# Patient Record
Sex: FEMALE | Race: WHITE | NOT HISPANIC OR LATINO | URBAN - METROPOLITAN AREA
[De-identification: names, ages, dates, MRNs, and addresses within clinical notes are randomized per-mention and may not be internally consistent; named-entity substitution may affect disease eponyms.]

---

## 2024-03-20 ENCOUNTER — HOSPITAL ENCOUNTER (OUTPATIENT)
Dept: RADIOLOGY | Facility: CLINIC | Age: 69
Discharge: HOME | End: 2024-03-20
Payer: MEDICARE

## 2024-03-20 ENCOUNTER — OFFICE VISIT (OUTPATIENT)
Dept: RHEUMATOLOGY | Facility: CLINIC | Age: 69
End: 2024-03-20
Payer: MEDICARE

## 2024-03-20 VITALS — SYSTOLIC BLOOD PRESSURE: 126 MMHG | HEART RATE: 70 BPM | DIASTOLIC BLOOD PRESSURE: 76 MMHG

## 2024-03-20 DIAGNOSIS — M15.9 GENERALIZED OSTEOARTHRITIS OF HAND: ICD-10-CM

## 2024-03-20 DIAGNOSIS — M15.9 PRIMARY OSTEOARTHRITIS INVOLVING MULTIPLE JOINTS: Primary | ICD-10-CM

## 2024-03-20 PROCEDURE — 99205 OFFICE O/P NEW HI 60 MIN: CPT | Performed by: STUDENT IN AN ORGANIZED HEALTH CARE EDUCATION/TRAINING PROGRAM

## 2024-03-20 PROCEDURE — 1036F TOBACCO NON-USER: CPT | Performed by: STUDENT IN AN ORGANIZED HEALTH CARE EDUCATION/TRAINING PROGRAM

## 2024-03-20 PROCEDURE — 73130 X-RAY EXAM OF HAND: CPT | Mod: 50

## 2024-03-20 PROCEDURE — 1159F MED LIST DOCD IN RCRD: CPT | Performed by: STUDENT IN AN ORGANIZED HEALTH CARE EDUCATION/TRAINING PROGRAM

## 2024-03-20 PROCEDURE — 73130 X-RAY EXAM OF HAND: CPT | Mod: BILATERAL PROCEDURE | Performed by: RADIOLOGY

## 2024-03-20 RX ORDER — ATORVASTATIN CALCIUM 10 MG/1
10 TABLET, FILM COATED ORAL DAILY
COMMUNITY

## 2024-03-20 RX ORDER — NAPROXEN 500 MG/1
TABLET ORAL EVERY 12 HOURS
COMMUNITY

## 2024-03-20 RX ORDER — LISINOPRIL 20 MG/1
TABLET ORAL EVERY 24 HOURS
COMMUNITY

## 2024-03-20 RX ORDER — OXYBUTYNIN CHLORIDE 10 MG/1
TABLET, EXTENDED RELEASE ORAL EVERY 24 HOURS
COMMUNITY

## 2024-03-20 NOTE — PROGRESS NOTES
Subjective   Patient ID: Vivi Diehl is a 68 y.o. female who presents for Arthritis (NPV to establish care. C/o hand and toe pain. ).  HPI:Referred by Dr Kofi Joel    New Patient; Here for hand arthritis    Female with a history of hypertension, urinary frequency, OA  hyperlipidemia, fatigue, arthritis, right great toe arthroscopy/stabilization in 2015 due to arthritis. Also has heberden nodes    Patient was diagnosed with arthritis around 2004; a chest xray was done and it showed arthritis in the spine,     Takes naproxen 500 mg twice a day ; has issues with toes; has had surgery of one of her toes.    Patient has pain in her toes, fingers, elbows, hips, upper back. Pain is worst at end of day. Patient tries not to take naproxen twice a day; she takes it in the morning; but she feels the pain more at night. Has morning stiffness in upper back, and hips for a few minutes.       Rheumatology specific review of systems   joint pain, morning stiffness>30 min, fevers , chills, unintentional weight loss, rashes, alopecia, mouth sores, nasal ulcers, , Raynauds, morning stiffness in back,>30 min dry eyes, dry mouth, blood clots, 2 miscarriages/had endometriosis, sister had scleroderma and passed away from it, cousin with lupus on benlysta, cousin with RA on enbrel    Chronic pain specific review of systems  widespread pain , widespread tenderness, brain fog, depression/anxiety, migraines or tension headaches, IBS or heartburn symptoms, irritable or overactive bladder, pelvic pain ,TMJ pain,poor sleep, fatigue    Objective   /76 (BP Location: Left arm, Patient Position: Sitting, BP Cuff Size: Large adult)   Pulse 70       Physical Exam  Constitutional: Alert and in no acute distress. Well developed, well nourished  Head and Face: Head and face: Normal.    Cardiovascular: Heart rate and rhythm were normal, normal S1 and S2. No peripheral edema.   Pulmonary: No respiratory distress. Clear bilateral breath  "sounds.  Musculoskeletal: subjective tightness of tighthess when she abducts right shoulder, chronic elbow contractures about 10 degrees, no synovitis of hands, but heberden nodes, ankle and feet sore   Skin: Normal skin color and pigmentation, normal skin turgor, and no rash.    Psychiatric: Judgment and insight: Intact. Mood and affect: Normal.     No results found for: \"WBC\", \"HGB\", \"HCT\", \"PLT\", \"ALT\", \"AST\", \"CREATININE\"       No results found for: \"ANANP\", \"ANATITERADD\", \"ANACO\", \"ANAPATTRN\", \"ANPA2\", \"FANAP\", \"ANATITER\", \"ANAT2\", \"DENA\", \"CDCANA\", \"DSDN\", \"EMPSMRNP\", \"SCLN\", \"SCLABQ\", \"SCIB\", \"CTIB\", \"MONUYX37\", \"WATSFU94\", \"ASSB\", \"SSBB\", \"C3\", \"C4\", \"UAMICCOMM\", \"UTPCR\", \"ANTIRIBO\", \"ACEN\", \"SEDRATE\", \"NONUHFIRE\", \"POCESR\", \"CRP\", \"RF\", \"CCPIGGQUAL\"\\            There is currently no information documented on the homunculus. Go to the Rheumatology activity and complete the homunculus joint exam.      Assessment/Plan:  Patient's joint pain is consistent with generalized osteoarthritis.  Although she does have a strong family history of autoimmune issues (scleroderma, lupus, RA), she is not displaying any signs of rheumatologic illness at this time    #OA of multiple joints  -Xray  hands today    -Personally viewed xray of her right foot that she brought with her    -Target a BMI of 25 (safely and gradually)    -Continue using the voltaren gel, liberally, as much as needed    -Retrial tylenol 500 mg- 1000 mg up to three times a day     -Only use naproxen for breakthrough- she is getting this through primary care    -If you have combine them, that's okay    -Physical /Occupational Therapy referral    Patient counseled to seek medical care if any new or worsening symptoms, urgently if needed.      Note will be sent to primary care doctor.    Return to clinic in 6-12 mo, or prn,  sooner if needed    Dragon dictation software was used to dictate this note. Errors may have occurred during dictation that was not " intended by the user.

## 2024-03-20 NOTE — LETTER
March 20, 2024     Kofi Joel MD  [id number] [facility] [id number]    Patient: Vivi Diehl   YOB: 1955   Date of Visit: 3/20/2024       Dear Dr. Kfoi Joel MD:    Thank you for referring Vivi Diehl to me for evaluation. Below are my notes for this consultation.  If you have questions, please do not hesitate to call me. I look forward to following your patient along with you.       Sincerely,     Teresa Rios MD      CC: No Recipients  ______________________________________________________________________________________    Subjective  Patient ID: Vivi Diehl is a 68 y.o. female who presents for Arthritis (NPV to establish care. C/o hand and toe pain. ).  HPI:Referred by Dr Kofi Joel    New Patient; Here for hand arthritis    Female with a history of hypertension, urinary frequency, OA  hyperlipidemia, fatigue, arthritis, right great toe arthroscopy/stabilization in 2015 due to arthritis. Also has [facility]    Patient was diagnosed with arthritis around 2004; a chest xray was done and it showed arthritis in the spine,     Takes naproxen 500 mg twice a day ; has issues with toes; has had surgery of one of her toes.    Patient has pain in her toes, fingers, elbows, hips, upper back. Pain is worst at end of day. Patient tries not to take naproxen twice a day; she takes it in the morning; but she feels the pain more at night. Has morning stiffness in upper back, and hips for a few minutes.       Rheumatology specific review of systems   joint pain, morning stiffness>30 min, fevers , chills, unintentional weight loss, rashes, alopecia, mouth sores, nasal ulcers, , Raynauds, morning stiffness in back,>30 min dry eyes, dry mouth, blood clots, 2 miscarriages/had endometriosis, sister had scleroderma and passed away from it, cousin with lupus on benlysta, cousin with RA on enbrel    Chronic pain specific review of systems  widespread pain , widespread tenderness,  "brain fog, depression/anxiety, migraines or tension headaches, IBS or heartburn symptoms, irritable or overactive bladder, pelvic pain ,TMJ pain,poor sleep, fatigue    Objective  /76 (BP Location: Left arm, Patient Position: Sitting, BP Cuff Size: Large adult)   Pulse 70       Physical Exam  Constitutional: Alert and in no acute distress. Well developed, well nourished  Head and Face: Head and face: Normal.    Cardiovascular: Heart rate and rhythm were normal, normal S1 and S2. No peripheral edema.   Pulmonary: No respiratory distress. Clear bilateral breath sounds.  Musculoskeletal: subjective tightness of tighthess when she abducts right shoulder, chronic elbow contractures about 10 degrees, no synovitis of hands, but heberden nodes, ankle and feet sore   Skin: Normal skin color and pigmentation, normal skin turgor, and no rash.    Psychiatric: Judgment and insight: Intact. Mood and affect: Normal.     No results found for: \"WBC\", \"HGB\", \"HCT\", \"PLT\", \"ALT\", \"AST\", \"CREATININE\"       No results found for: \"ANANP\", \"ANATITERADD\", \"ANACO\", \"ANAPATTRN\", \"ANPA2\", \"FANAP\", \"ANATITER\", \"ANAT2\", \"DENA\", \"CDCANA\", \"DSDN\", \"EMPSMRNP\", \"SCLN\", \"SCLABQ\", \"SCIB\", \"CTIB\", \"IQBTGP18\", \"MJACCU13\", \"ASSB\", \"SSBB\", \"C3\", \"C4\", \"UAMICCOMM\", \"UTPCR\", \"ANTIRIBO\", \"ACEN\", \"SEDRATE\", \"NONUHFIRE\", \"POCESR\", \"CRP\", \"RF\", \"CCPIGGQUAL\"\\            There is currently no information documented on the homunculus. Go to the Rheumatology activity and complete the homunculus joint exam.      Assessment/Plan:  Patient's joint pain is consistent with generalized osteoarthritis.  Although she does have a strong family history of autoimmune issues (scleroderma, lupus, RA), she is not displaying any signs of rheumatologic illness at this time    #OA of multiple joints  -Xray  hands today    -Personally viewed xray of her right foot that she brought with her    -Target a BMI of 25 (safely and gradually)    -Continue using the voltaren gel, " liberally, as much as needed    -Retrial tylenol 500 mg- 1000 mg up to three times a day     -Only use naproxen for breakthrough- she is getting this through primary care    -If you have combine them, that's okay    -Physical /Occupational Therapy referral    Patient counseled to seek medical care if any new or worsening symptoms, urgently if needed.      Note will be sent to primary care doctor.    Return to clinic in 6-12 mo, or prn,  sooner if needed    Dragon dictation software was used to dictate this note. Errors may have occurred during dictation that was not intended by the user.

## 2024-03-20 NOTE — PATIENT INSTRUCTIONS
Xrays today    Target a BMI of 25 (safely and gradually)    Continue using the voltaren gel, liberally, as much as needed    Retrial tylenol 500 mg- 1000 mg up to three times a day     Only use naproxen for breakthrough    If you have combine them, that's okay    Physical /Occupational Therapy referral

## 2024-07-23 ENCOUNTER — HOSPITAL ENCOUNTER (OUTPATIENT)
Dept: RADIOLOGY | Facility: CLINIC | Age: 69
Discharge: HOME | End: 2024-07-23
Payer: MEDICARE

## 2024-07-23 VITALS — WEIGHT: 175 LBS | BODY MASS INDEX: 32.2 KG/M2 | HEIGHT: 62 IN

## 2024-07-23 DIAGNOSIS — Z12.31 ENCOUNTER FOR SCREENING MAMMOGRAM FOR MALIGNANT NEOPLASM OF BREAST: ICD-10-CM

## 2024-07-23 PROCEDURE — 77067 SCR MAMMO BI INCL CAD: CPT

## 2024-07-23 PROCEDURE — 77067 SCR MAMMO BI INCL CAD: CPT | Performed by: RADIOLOGY

## 2024-07-23 PROCEDURE — 77063 BREAST TOMOSYNTHESIS BI: CPT | Performed by: RADIOLOGY

## 2025-04-16 ENCOUNTER — OFFICE VISIT (OUTPATIENT)
Dept: URGENT CARE | Age: 70
End: 2025-04-16
Payer: MEDICARE

## 2025-04-16 VITALS
SYSTOLIC BLOOD PRESSURE: 125 MMHG | OXYGEN SATURATION: 98 % | HEART RATE: 68 BPM | DIASTOLIC BLOOD PRESSURE: 73 MMHG | RESPIRATION RATE: 16 BRPM

## 2025-04-16 DIAGNOSIS — H10.9 BACTERIAL CONJUNCTIVITIS OF LEFT EYE: Primary | ICD-10-CM

## 2025-04-16 RX ORDER — OFLOXACIN 3 MG/ML
SOLUTION/ DROPS OPHTHALMIC
COMMUNITY
Start: 2025-04-16

## 2025-04-16 RX ORDER — DOXYCYCLINE HYCLATE 20 MG
TABLET ORAL
COMMUNITY
Start: 2025-04-14

## 2025-04-16 RX ORDER — DEXTROMETHORPHAN HYDROBROMIDE, GUAIFENESIN 5; 100 MG/5ML; MG/5ML
650 LIQUID ORAL EVERY 8 HOURS PRN
COMMUNITY

## 2025-04-16 ASSESSMENT — PAIN SCALES - GENERAL: PAINLEVEL_OUTOF10: 0-NO PAIN

## 2025-04-16 NOTE — PROGRESS NOTES
Subjective   Patient ID: Vivi Diehl is a 69 y.o. female. They present today with a chief complaint of Conjunctivitis (Patient complaint of left eye swelling, redness, and drainage since yesterday).    History of Present Illness  Patient is a pleasant 69-year-old white female, no significant past medical history, presented to clinic for chief complaint of left eye redness.  Patient is reporting 1 day history of left eye redness with associated itching and mucopurulent drainage.  States it was crusted shut this morning.  States she does wear contacts.  She denies any trauma or injury to the eye.  No double or blurry vision.  Denies any pain with extraocular movements.  No swelling of the orbits.  She has no further complaints at this time.  Denies any upper respiratory congestion.  No chest pain or shortness of breath.  No abdominal pain, nausea, vomiting.      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 Her smoking use included cigarettes. She has been exposed to tobacco smoke. She has quit using smokeless tobacco. She reports that she does not drink alcohol and does not use drugs.    Review of Systems  Review of Systems                               Objective    Vitals:    04/16/25 1548   BP: 125/73   Pulse: 68   Resp: 16   SpO2: 98%     No LMP recorded. Patient is postmenopausal.    Physical Exam  General: Vitals Noted. No distress. Normocephalic.     HEENT: Evaluation of right eye is unremarkable.  Evaluation of left eye reveals palpebral and bulbar conjunctival injection with moderate amount of mucopurulent  discharge noted in the medial canthus.  There is some crusting to the eyelids as well.  There is no orbital edema with no signs of.  Postseptal cellulitis.  EOMs are intact and painless.  TMs normal, patent nares, Normal OP    Neck: Supple with no adenopathy.     Cardiac: Regular Rate and Rhythm. No murmur.     Pulmonary: Equal breath sounds bilaterally. No wheezes, rhonchi, or rales.    Abdomen: Soft, non-tender, with normal bowel sounds.     Musculoskeletal: Moves all extremities, no effusion, no edema.     Skin: No obvious rashes.  Procedures    Point of Care Test & Imaging Results from this visit    Imaging  No results found.    Cardiology, Vascular, and Other Imaging  No other imaging results found for the past 2 days      Diagnostic study results (if any) were reviewed by Francisco Ellison PA-C.    Assessment/Plan   Allergies, medications, history, and pertinent labs/EKGs/Imaging reviewed by Francisco Ellison PA-C.     Medical Decision Making  Patient was seen eval in the clinic for chief complaint of right eye redness.  On exam patient is nontoxic well-appearing respirator comfortably no acute distress.  Vital signs are stable, afebrile.  Chest clear, heart is regular, belly soft nontender peer evaluation of left eye as above consistent with a bacterial conjunctivitis.  Will provide ofloxacin drops 4 times a day for the next 7 days.  Minimal concern for.  Preseptal cellulitis.  Minimal concern for scleritis.  Minimal concern for stye.  Will be discharged home in stable condition with instruction to follow-up with primary care physician in the next 2 to 3 days.  I reviewed my impression, plan, strict return precautions with the patient.  She expresses understanding and agreement with plan of care.    Orders and Diagnoses  There are no diagnoses linked to this encounter.      Medical Admin Record      Follow Up Instructions  No follow-ups on file.    Patient disposition: Home    Electronically signed by  Francisco Ellison PA-C  3:56 PM         [1] (Not in a hospital admission)  [2] No past medical history on file.  [3] No past surgical history on file.

## 2025-08-25 ENCOUNTER — HOSPITAL ENCOUNTER (OUTPATIENT)
Dept: RADIOLOGY | Facility: HOSPITAL | Age: 70
Discharge: HOME | End: 2025-08-25
Payer: MEDICARE

## 2025-08-25 DIAGNOSIS — Z78.0 ASYMPTOMATIC MENOPAUSAL STATE: ICD-10-CM

## 2025-08-25 DIAGNOSIS — Z12.31 ENCOUNTER FOR SCREENING MAMMOGRAM FOR MALIGNANT NEOPLASM OF BREAST: ICD-10-CM

## 2025-08-25 PROCEDURE — 77067 SCR MAMMO BI INCL CAD: CPT | Performed by: RADIOLOGY

## 2025-08-25 PROCEDURE — 77063 BREAST TOMOSYNTHESIS BI: CPT | Performed by: RADIOLOGY

## 2025-08-25 PROCEDURE — 77080 DXA BONE DENSITY AXIAL: CPT

## 2025-08-25 PROCEDURE — 77080 DXA BONE DENSITY AXIAL: CPT | Performed by: RADIOLOGY

## 2025-08-25 PROCEDURE — 77067 SCR MAMMO BI INCL CAD: CPT
